# Patient Record
Sex: FEMALE | Race: WHITE
[De-identification: names, ages, dates, MRNs, and addresses within clinical notes are randomized per-mention and may not be internally consistent; named-entity substitution may affect disease eponyms.]

---

## 2019-02-01 ENCOUNTER — HOSPITAL ENCOUNTER (OUTPATIENT)
Dept: HOSPITAL 89 - MAMO | Age: 64
End: 2019-02-01
Attending: OBSTETRICS & GYNECOLOGY
Payer: COMMERCIAL

## 2019-02-01 DIAGNOSIS — Z13.31: Primary | ICD-10-CM

## 2019-02-01 PROCEDURE — 77067 SCR MAMMO BI INCL CAD: CPT

## 2019-02-01 PROCEDURE — 77063 BREAST TOMOSYNTHESIS BI: CPT

## 2019-02-04 NOTE — RADIOLOGY IMAGING REPORT
[place]AC[place] [facility] [place] 

 

PATIENT NAME: FAWN GRESHAM

: [id number]

MR: [id number]

V: [id number]

EXAM DATE: 

ORDERING PHYSICIAN: DAMARI MENDIETA

TECHNOLOGIST: Shirin Peres

 

PROCEDURE:  MAMMOGRAM SCREENING RIGHT UNILATERAL WITH CAD ASSISTED 

INTERPRETATION & 3D TOMOSYNTHESIS 

 

COMPARISON:  Prior mammograms 10/12/17, 16, 4/22/15, 4/3/14, 

10/17/12.

 

INDICATIONS:  SCREENING PRIOR LEFT BREAST CANCER WITH MASTECTOMY IN 



 

FINDINGS:   

The Right breast is heterogeneously dense which can obscure small 

masses. The parenchymal pattern has remained stable allowing for 

difference in mammographic technique & patient positioning.

 

DIAGNOSTIC CATEGORY 1--NEGATIVE.  

 

RECOMMENDATIONS:

ROUTINE MAMMOGRAM AND CLINICAL EVALUATION.   

 

IMPRESSION:

BIRADS 1: Negative.

No significant abnormality of the Right breast is seen.

 

 

  

 

 

Dictated by:  Mara Jimenes M.D. on 2019 at 11:49   

Transcribed by: FIX on 2019 at 13:19    

Approved by:  Mara Jimenes M.D. on 2019 at 10:17   

[facility]